# Patient Record
Sex: FEMALE | Race: WHITE | Employment: FULL TIME | URBAN - METROPOLITAN AREA
[De-identification: names, ages, dates, MRNs, and addresses within clinical notes are randomized per-mention and may not be internally consistent; named-entity substitution may affect disease eponyms.]

---

## 2020-10-30 ENCOUNTER — TRANSFERRED RECORDS (OUTPATIENT)
Dept: HEALTH INFORMATION MANAGEMENT | Facility: CLINIC | Age: 40
End: 2020-10-30

## 2021-01-21 ENCOUNTER — TRANSFERRED RECORDS (OUTPATIENT)
Dept: HEALTH INFORMATION MANAGEMENT | Facility: CLINIC | Age: 41
End: 2021-01-21

## 2021-01-28 ENCOUNTER — TRANSFERRED RECORDS (OUTPATIENT)
Dept: HEALTH INFORMATION MANAGEMENT | Facility: CLINIC | Age: 41
End: 2021-01-28

## 2021-02-01 ENCOUNTER — TRANSCRIBE ORDERS (OUTPATIENT)
Dept: OTHER | Age: 41
End: 2021-02-01

## 2021-02-01 DIAGNOSIS — E07.9 THYROID EYE DISEASE: Primary | ICD-10-CM

## 2021-02-01 DIAGNOSIS — H57.89 THYROID EYE DISEASE: Primary | ICD-10-CM

## 2021-04-16 ENCOUNTER — TELEPHONE (OUTPATIENT)
Dept: OPHTHALMOLOGY | Facility: CLINIC | Age: 41
End: 2021-04-16

## 2021-04-19 ENCOUNTER — OFFICE VISIT (OUTPATIENT)
Dept: OPHTHALMOLOGY | Facility: CLINIC | Age: 41
End: 2021-04-19
Attending: INTERNAL MEDICINE
Payer: COMMERCIAL

## 2021-04-19 DIAGNOSIS — H57.89 THYROID EYE DISEASE: ICD-10-CM

## 2021-04-19 DIAGNOSIS — E07.9 THYROID EYE DISEASE: ICD-10-CM

## 2021-04-19 PROCEDURE — 99204 OFFICE O/P NEW MOD 45 MIN: CPT | Mod: GC | Performed by: OPHTHALMOLOGY

## 2021-04-19 PROCEDURE — 92285 EXTERNAL OCULAR PHOTOGRAPHY: CPT | Performed by: OPHTHALMOLOGY

## 2021-04-19 PROCEDURE — 92285 EXTERNAL OCULAR PHOTOGRAPHY: CPT | Mod: 26 | Performed by: OPHTHALMOLOGY

## 2021-04-19 PROCEDURE — G0463 HOSPITAL OUTPT CLINIC VISIT: HCPCS

## 2021-04-19 ASSESSMENT — TONOMETRY
IOP_METHOD: ICARE
OS_IOP_MMHG: 18
OD_IOP_MMHG: 20

## 2021-04-19 ASSESSMENT — CUP TO DISC RATIO
OS_RATIO: 0.25
OD_RATIO: 0.25

## 2021-04-19 ASSESSMENT — REFRACTION_WEARINGRX
OD_AXIS: 105
OS_AXIS: 105
OD_CYLINDER: +0.50
OD_SPHERE: +2.00
OS_CYLINDER: +0.50
OS_SPHERE: +1.25

## 2021-04-19 ASSESSMENT — EXTERNAL EXAM - RIGHT EYE: OD_EXAM: NORMAL

## 2021-04-19 ASSESSMENT — VISUAL ACUITY
CORRECTION_TYPE: GLASSES
OD_CC+: -2
METHOD: SNELLEN - LINEAR
OS_CC: 20/20
OD_CC: 20/20

## 2021-04-19 ASSESSMENT — EXTERNAL EXAM - LEFT EYE: OS_EXAM: NORMAL

## 2021-04-19 NOTE — Clinical Note
4/19/2021       RE: Beatriz Du  [address] [facility] [address]     Dear Colleague,    Thank you for referring your patient, Beatriz Du, to the [facility] at [facility]. Please see a copy of my visit note below.         Assessment & Plan     HPI: Ms. Du is a 40F with a history of Graves disease diagnosed 2017 presenting upon referral from endocrinology (Dr. Chantal Kan) for evaluation of possible thyroid eye disease.  She was diagnosed with Grave's in 2017, medically managed for the 1st year with levothyroxine and cytomel and then underwent total thyroidectomy in 2018.  She now takes levothyroxine 112 mcg with a goal TSH < 2.5.  Her eye symptoms include pressure behind the eye, intermittent blurry vision (on preservative free artificial tears taking only about twice per month with some improvement).  She has constant tearing, especially when in the wind.  She uses a Amrita mask when the eye pressure symptoms are at their worst.  She has sporadic bilateral monocular ghosting of images.  Eyes are becoming more prominent over time (this improved after thyroidectomy).  No flashes/floaters.  No discharge.        Referring physician: Dr. Chantal Kan    Thyroid history:  Diagnosed when? 2017  HEWITT: None  Thyroidectomy: Total in 2018    TSI (date): 169% of baseline (1/2021)      Previous results: 682 (in 1/2018)    Eye symptoms (since when):   Proptosis (better/worse/same since last visit): Stable  Diplopia(better/worse/same since last visit): Stable  Eyelid retraction(better/worse/same since last visit): Stable  Tearing(better/worse/same since last visit): Stable  Redness (better/worse/same since last visit): Stable  Pain ((better/worse/same since last visit): Stable  Pain to move the eyes (better/worse/same since last visit): Worse last 3-4 months  Blurred vision: Stable    Ocular history:   Orbital decompression  (date, details): NA  Strabismus surgery (date, details): NA  Eyelid surgery (date, details): NA    Exam:   Carolina (base): 23/23 (96 mm)   Better/worse same: Initial  Strabismus (better/worse/same): Initial  Eyelid retraction (better/worse/same): Initial    Clinical activity score:  1. Spontaneous orbital pain.     yes  2. Gaze evoked orbital pain.     yes  3. Eyelid swelling due to active thyroid eye disease  no  4. Eyelid erythema.      no  5. Conjunctival redness due to active thyroid eye disease . no  6. Chemosis.        no  7. Inflammation of caruncle OR plica.    yes    Patients assessed after follow-up can be scored out of 10 by  including items 8-10.    8. Increase of > 2mm in proptosis.    NA   9. Decrease in uniocular excursion in any direction of > 8 . NA  10. Decrease of acuity equivalent to 1 Snellen line.  NA    ERVIN SCORE = 3      Beatriz Du is a 40 year old female with the following diagnoses:   1. Thyroid eye disease       Thyroid eye disease (JUNIOR).  The natural history of thyroid eye disease was discussed. We told the patient that typically JUNIOR will worsen for a period of time, then improve to some degree, and then stabilize without normalizing.  This process can take somewhere between 1 and 3 years on average.  In the meantime, we recommended seeing the patient in the [facility] every 6 months (sooner if the patient experiences worsening vision in either eye).  Once the patient becomes stable for at least 6 months' time, we discussed that the patient may need restorative surgery or prisms.  Finally, we discussed that correcting the thyroid hormone levels does not ensure that the eyes will normalize but that it could help to some degree.      Overall, she looks pretty quiet.  Would recommend follow up in 6 months.  If stable, then would consider orbital decompression.             Attending Physician Attestation:  Complete documentation of historical and exam elements from  today's encounter can be found in the full encounter summary report (not reduplicated in this progress note).  I personally obtained the chief complaint(s) and history of present illness.  I confirmed and edited as necessary the review of systems, past medical/surgical history, family history, social history, and examination findings as documented by others; and I examined the patient myself.  I personally reviewed the relevant tests, images, and reports as documented above.  I formulated and edited as necessary the assessment and plan and discussed the findings and management plan with the patient and family. I personally reviewed the ophthalmic test(s) associated with this encounter, agree with the interpretation(s) as documented by the resident/fellow, and have edited the corresponding report(s) as necessary.  - Matt Kincaid, PGY3  Ophthalmology Resident             Assessment & Plan     HPI: Ms. Du is a 40F with a history of Graves disease diagnosed 2017 presenting upon referral from endocrinology (Dr. Chantal Kan) for evaluation of possible thyroid eye disease.  She was diagnosed with Grave's in 2017, medically managed for the 1st year with levothyroxine and cytomel and then underwent total thyroidectomy in 2018.  She now takes levothyroxine 112 mcg with a goal TSH < 2.5.  Her eye symptoms include pressure behind the eye, intermittent blurry vision (on preservative free artificial tears taking only about twice per month with some improvement).  She has constant tearing, especially when in the wind.  She uses a Amrita mask when the eye pressure symptoms are at their worst.  She has sporadic bilateral monocular ghosting of images.  Eyes are becoming more prominent over time (this improved after thyroidectomy).  No flashes/floaters.  No discharge.        Referring physician: Dr. Chantal Kan    Thyroid history:  Diagnosed when? 2017  HEWITT: None  Thyroidectomy: Total in  2018    TSI (date): 169% of baseline (1/2021)      Previous results: 682 (in 1/2018)    Eye symptoms (since when):   Proptosis (better/worse/same since last visit): Stable  Diplopia(better/worse/same since last visit): Stable  Eyelid retraction(better/worse/same since last visit): Stable  Tearing(better/worse/same since last visit): Stable  Redness (better/worse/same since last visit): Stable  Pain ((better/worse/same since last visit): Stable  Pain to move the eyes (better/worse/same since last visit): Worse last 3-4 months  Blurred vision: Stable    Ocular history:   Orbital decompression (date, details): NA  Strabismus surgery (date, details): NA  Eyelid surgery (date, details): NA    Exam:   Carolina (base): 23/23 (96 mm)   Better/worse same: Initial  Strabismus (better/worse/same): Initial  Eyelid retraction (better/worse/same): Initial    Clinical activity score:  1. Spontaneous orbital pain.     yes  2. Gaze evoked orbital pain.     yes  3. Eyelid swelling due to active thyroid eye disease  no  4. Eyelid erythema.      no  5. Conjunctival redness due to active thyroid eye disease . no  6. Chemosis.        no  7. Inflammation of caruncle OR plica.    yes    Patients assessed after follow-up can be scored out of 10 by  including items 8-10.    8. Increase of > 2mm in proptosis.    NA   9. Decrease in uniocular excursion in any direction of > 8 . NA  10. Decrease of acuity equivalent to 1 Snellen line.  NA    ERVIN SCORE = 3      Beatriz Du is a 40 year old female with the following diagnoses:   1. Thyroid eye disease       Thyroid eye disease (JUNIOR).  The natural history of thyroid eye disease was discussed. We told the patient that typically JUNIOR will worsen for a period of time, then improve to some degree, and then stabilize without normalizing.  This process can take somewhere between 1 and 3 years on average.  In the meantime, we recommended seeing the patient in the [facility] every 6  months (sooner if the patient experiences worsening vision in either eye).  Once the patient becomes stable for at least 6 months' time, we discussed that the patient may need restorative surgery or prisms.  Finally, we discussed that correcting the thyroid hormone levels does not ensure that the eyes will normalize but that it could help to some degree.      Overall, she looks pretty quiet.  Would recommend follow up in 6 months.  If stable, then would consider orbital decompression.  Case discuss with Grupo De La O MD and Dr. Ras Mora.            Attending Physician Attestation:  Complete documentation of historical and exam elements from today's encounter can be found in the full encounter summary report (not reduplicated in this progress note).  I personally obtained the chief complaint(s) and history of present illness.  I confirmed and edited as necessary the review of systems, past medical/surgical history, family history, social history, and examination findings as documented by others; and I examined the patient myself.  I personally reviewed the relevant tests, images, and reports as documented above.  I formulated and edited as necessary the assessment and plan and discussed the findings and management plan with the patient and family. I personally reviewed the ophthalmic test(s) associated with this encounter, agree with the interpretation(s) as documented by the resident/fellow, and have edited the corresponding report(s) as necessary.  - Matt Kincaid, PGY3  Ophthalmology Resident          Again, thank you for allowing me to participate in the care of your patient.      Sincerely,    Matt Templeton MD

## 2021-04-19 NOTE — PROGRESS NOTES
Assessment & Plan     HPI: Ms. Du is a 40F with a history of Graves disease diagnosed 2017 presenting upon referral from endocrinology (Dr. Chantal Kan) for evaluation of possible thyroid eye disease.  She was diagnosed with Grave's in 2017, medically managed for the 1st year with levothyroxine and cytomel and then underwent total thyroidectomy in 2018.  She now takes levothyroxine 112 mcg with a goal TSH < 2.5.  Her eye symptoms include pressure behind the eye, intermittent blurry vision (on preservative free artificial tears taking only about twice per month with some improvement).  She has constant tearing, especially when in the wind.  She uses a Amrita mask when the eye pressure symptoms are at their worst.  She has sporadic bilateral monocular ghosting of images.  Eyes are becoming more prominent over time (this improved after thyroidectomy).  No flashes/floaters.  No discharge.        Referring physician: Dr. Chantal Kan    Thyroid history:  Diagnosed when? 2017  HEWITT: None  Thyroidectomy: Total in 2018    TSI (date): 169% of baseline (1/2021)      Previous results: 682 (in 1/2018)    Eye symptoms (since when):   Proptosis (better/worse/same since last visit): Stable  Diplopia(better/worse/same since last visit): Stable  Eyelid retraction(better/worse/same since last visit): Stable  Tearing(better/worse/same since last visit): Stable  Redness (better/worse/same since last visit): Stable  Pain ((better/worse/same since last visit): Stable  Pain to move the eyes (better/worse/same since last visit): Worse last 3-4 months  Blurred vision: Stable    Ocular history:   Orbital decompression (date, details): NA  Strabismus surgery (date, details): NA  Eyelid surgery (date, details): NA    Exam:   Carolina (base): 23/23 (96 mm)   Better/worse same: Initial  Strabismus (better/worse/same): Initial  Eyelid retraction (better/worse/same): Initial    Clinical activity score:  1. Spontaneous orbital  pain.     yes  2. Gaze evoked orbital pain.     yes  3. Eyelid swelling due to active thyroid eye disease  no  4. Eyelid erythema.      no  5. Conjunctival redness due to active thyroid eye disease . no  6. Chemosis.        no  7. Inflammation of caruncle OR plica.    yes    Patients assessed after follow-up can be scored out of 10 by  including items 8-10.    8. Increase of > 2mm in proptosis.    NA   9. Decrease in uniocular excursion in any direction of > 8 . NA  10. Decrease of acuity equivalent to 1 Snellen line.  NA    ERVIN SCORE = 3      Beatriz Du is a 40 year old female with the following diagnoses:   1. Thyroid eye disease       Thyroid eye disease (JUNIOR).  The natural history of thyroid eye disease was discussed. We told the patient that typically JUNIOR will worsen for a period of time, then improve to some degree, and then stabilize without normalizing.  This process can take somewhere between 1 and 3 years on average.  In the meantime, we recommended seeing the patient in the [facility] every 6 months (sooner if the patient experiences worsening vision in either eye).  Once the patient becomes stable for at least 6 months' time, we discussed that the patient may need restorative surgery or prisms.  Finally, we discussed that correcting the thyroid hormone levels does not ensure that the eyes will normalize but that it could help to some degree.      Overall, she looks pretty quiet.  Would recommend follow up in 6 months.  If stable, then would consider orbital decompression.  Case discuss with Grupo De La O MD and Dr. Ras Mora.            Attending Physician Attestation:  Complete documentation of historical and exam elements from today's encounter can be found in the full encounter summary report (not reduplicated in this progress note).  I personally obtained the chief complaint(s) and history of present illness.  I confirmed and edited as necessary the review of  systems, past medical/surgical history, family history, social history, and examination findings as documented by others; and I examined the patient myself.  I personally reviewed the relevant tests, images, and reports as documented above.  I formulated and edited as necessary the assessment and plan and discussed the findings and management plan with the patient and family. I personally reviewed the ophthalmic test(s) associated with this encounter, agree with the interpretation(s) as documented by the resident/fellow, and have edited the corresponding report(s) as necessary.  - Matt Kincaid, PGY3  Ophthalmology Resident

## 2021-04-19 NOTE — NURSING NOTE
Chief Complaint(s) and History of Present Illness(es)     Thyroid Disease     Laterality: both eyes    Associated symptoms: eye pain, dryness, tearing and double vision              Comments     Hx of Grave's diagnosed about 3-4 yrs ago. Hx of thyroidectomy.   Takes Levothyroxine 112 MCG and Cytomel. Levothyroxine was increased from 100 MCG in January.   Ocular symptoms started before she was diagnosed with Grave's. First symptoms was excessive tearing. Has intermittent diplopia, monocular. Feels pressure behind eyes and pain with eye movements. Does not use eye drops.

## 2021-04-19 NOTE — NURSING NOTE
Chief Complaint(s) and History of Present Illness(es)     Thyroid Disease     Laterality: both eyes    Associated symptoms: eye pain, dryness, tearing and double vision              Comments     Hx of Grave's diagnosed about 3-4 yrs ago. Hx of thyroidectomy.   Takes Levothyroxine 112 MCG and Cytomel. Levothyroxine was increased from 100 MCG in January.   Ocular symptoms started before she was diagnosed with Grave's. First symptom was excessive tearing. Has intermittent diplopia, monocular. Feels pressure behind eyes and pain with eye movements. Does not use eye drops.

## 2021-04-19 NOTE — LETTER
2021         RE:  :  MRN: Beatriz Du  1980  [id number]     Dear Dr. Kan,    Thank you for asking me to see your very pleasant patient, Beatriz Du, in neuro-ophthalmic consultation.  I would like to thank you for sending your records and I have summarized them in the history of present illness. She presented with her spouse who provided additional history.  My assessment and plan are below.  For further details, please see my attached clinic note.      Assessment & Plan     HPI: Ms. Du is a 40F with a history of Graves disease diagnosed  presenting upon referral from endocrinology (Dr. Chantal Kan) for evaluation of possible thyroid eye disease.  She was diagnosed with Grave's in 2017, medically managed for the 1st year with levothyroxine and cytomel and then underwent total thyroidectomy in 2018.  She now takes levothyroxine 112 mcg with a goal TSH < 2.5.  Her eye symptoms include pressure behind the eye, intermittent blurry vision (on preservative free artificial tears taking only about twice per month with some improvement).  She has constant tearing, especially when in the wind.  She uses a Amrita mask when the eye pressure symptoms are at their worst.  She has sporadic bilateral monocular ghosting of images.  Eyes are becoming more prominent over time (this improved after thyroidectomy).  No flashes/floaters.  No discharge.        Referring physician: Dr. Chantal Kan    Thyroid history:  Diagnosed when? 2017  HEWITT: None  Thyroidectomy: Total in 2018    TSI (date): 169% of baseline (2021)      Previous results: 682 (in 2018)    Eye symptoms (since when):   Proptosis (better/worse/same since last visit): Stable  Diplopia(better/worse/same since last visit): Stable  Eyelid retraction(better/worse/same since last visit): Stable  Tearing(better/worse/same since last visit): Stable  Redness (better/worse/same since last visit): Stable  Pain ((better/worse/same  since last visit): Stable  Pain to move the eyes (better/worse/same since last visit): Worse last 3-4 months  Blurred vision: Stable    Ocular history:   Orbital decompression (date, details): NA  Strabismus surgery (date, details): NA  Eyelid surgery (date, details): NA    Exam:   Carolina (base): 23/23 (96 mm)   Better/worse same: Initial  Strabismus (better/worse/same): Initial  Eyelid retraction (better/worse/same): Initial    Clinical activity score:  1. Spontaneous orbital pain.     yes  2. Gaze evoked orbital pain.     yes  3. Eyelid swelling due to active thyroid eye disease  no  4. Eyelid erythema.      no  5. Conjunctival redness due to active thyroid eye disease . no  6. Chemosis.        no  7. Inflammation of caruncle OR plica.    yes    Patients assessed after follow-up can be scored out of 10 by  including items 8-10.    8. Increase of > 2mm in proptosis.    NA   9. Decrease in uniocular excursion in any direction of > 8 . NA  10. Decrease of acuity equivalent to 1 Snellen line.  NA    ERVIN SCORE = 3      Beatriz Du is a 40 year old female with the following diagnoses:   1. Thyroid eye disease       Thyroid eye disease (JUNIOR).  The natural history of thyroid eye disease was discussed. We told the patient that typically JUNIOR will worsen for a period of time, then improve to some degree, and then stabilize without normalizing.  This process can take somewhere between 1 and 3 years on average.  In the meantime, we recommended seeing the patient in the [facility] every 6 months (sooner if the patient experiences worsening vision in either eye).  Once the patient becomes stable for at least 6 months' time, we discussed that the patient may need restorative surgery or prisms.  Finally, we discussed that correcting the thyroid hormone levels does not ensure that the eyes will normalize but that it could help to some degree.      Overall, she looks pretty quiet.  Would recommend follow up  in 6 months.  If stable, then would consider orbital decompression.          Again, thank you for allowing me to participate in the care of your patient.      Sincerely,    Matt Templeton MD  Professor  Ophthalmology Residency   Director of Neuro-Ophthalmology  [employer] Endowed Chair  Departments of Ophthalmology, Neurology, and Neurosurgery  [facility] [address] [place] [address]  [id number]  T - [phone]  F - [phone]  [email or web address]      CC: Chantal Kan MD  Endocrinology Clinic [address] [address]  Via Fax: [phone]    DX = Thyroid eye disease

## 2021-08-31 ENCOUNTER — TELEPHONE (OUTPATIENT)
Dept: OPHTHALMOLOGY | Facility: CLINIC | Age: 41
End: 2021-08-31

## 2021-08-31 NOTE — TELEPHONE ENCOUNTER
"[facility] Call Center    Phone Message    May a detailed message be left on voicemail: yes     Reason for Call: Other: Pt calling in she was informed by her pcp to see if Dr Head wanted her to wait until her thyroid levels are normal prior to coming in or if she should keep her appointment in November, please call back to discuss further       WRITER CALLED CLINIC LINE AND WAS INFORMED BY AURE FARNSWORTH SEND A TE TO  \"DR HEADS CARE TEAM FOR MEDICAL/NURSING QUESTIONS, AND TO SEND THE ENCOUNTER OVER TO THE  FACILITATOR\"     Action Taken: Message routed to:  [facility]     Travel Screening: Not Applicable                                                                      "

## 2021-09-01 NOTE — TELEPHONE ENCOUNTER
Patient feels her eyes are stable compared to when seen in clinic.  Her recent thyroid labs were also within normal range, but TSI was at 205 (normal less than 140).  Since she is in [place] she doesn't want to come up if they won't consider surgery because TSI still elevated.    Spoke to Maureen to verify that they do not always base off the TSI level but do like to see trending down.  Biggest thing is to see stable measurements in clinic     Cookie Freedman on 9/1/2021 at 3:04 PM